# Patient Record
Sex: FEMALE | Race: WHITE | NOT HISPANIC OR LATINO
[De-identification: names, ages, dates, MRNs, and addresses within clinical notes are randomized per-mention and may not be internally consistent; named-entity substitution may affect disease eponyms.]

---

## 2017-08-15 ENCOUNTER — APPOINTMENT (OUTPATIENT)
Dept: ORTHOPEDIC SURGERY | Facility: CLINIC | Age: 62
End: 2017-08-15
Payer: COMMERCIAL

## 2017-08-15 VITALS
BODY MASS INDEX: 28.7 KG/M2 | OXYGEN SATURATION: 96 % | HEIGHT: 61 IN | DIASTOLIC BLOOD PRESSURE: 80 MMHG | WEIGHT: 152 LBS | SYSTOLIC BLOOD PRESSURE: 144 MMHG | HEART RATE: 95 BPM

## 2017-08-15 PROCEDURE — 99214 OFFICE O/P EST MOD 30 MIN: CPT

## 2017-08-15 RX ORDER — ROSUVASTATIN CALCIUM 10 MG/1
10 TABLET, FILM COATED ORAL
Refills: 0 | Status: ACTIVE | COMMUNITY
Start: 2017-08-15

## 2017-09-12 ENCOUNTER — TRANSCRIPTION ENCOUNTER (OUTPATIENT)
Age: 62
End: 2017-09-12

## 2018-04-24 ENCOUNTER — APPOINTMENT (OUTPATIENT)
Dept: ORTHOPEDIC SURGERY | Facility: CLINIC | Age: 63
End: 2018-04-24
Payer: COMMERCIAL

## 2018-04-24 VITALS
DIASTOLIC BLOOD PRESSURE: 80 MMHG | HEIGHT: 61 IN | BODY MASS INDEX: 29.27 KG/M2 | HEART RATE: 76 BPM | WEIGHT: 155 LBS | OXYGEN SATURATION: 98 % | SYSTOLIC BLOOD PRESSURE: 130 MMHG

## 2018-04-24 PROCEDURE — 99214 OFFICE O/P EST MOD 30 MIN: CPT

## 2018-09-05 ENCOUNTER — APPOINTMENT (OUTPATIENT)
Dept: ORTHOPEDIC SURGERY | Facility: CLINIC | Age: 63
End: 2018-09-05
Payer: COMMERCIAL

## 2018-09-05 VITALS
HEIGHT: 61.02 IN | WEIGHT: 158 LBS | SYSTOLIC BLOOD PRESSURE: 120 MMHG | OXYGEN SATURATION: 97 % | BODY MASS INDEX: 29.83 KG/M2 | HEART RATE: 90 BPM | DIASTOLIC BLOOD PRESSURE: 70 MMHG

## 2018-09-05 PROCEDURE — 99214 OFFICE O/P EST MOD 30 MIN: CPT

## 2019-01-29 ENCOUNTER — APPOINTMENT (OUTPATIENT)
Dept: ORTHOPEDIC SURGERY | Facility: CLINIC | Age: 64
End: 2019-01-29
Payer: COMMERCIAL

## 2019-01-29 PROCEDURE — 99214 OFFICE O/P EST MOD 30 MIN: CPT

## 2019-01-29 NOTE — HISTORY OF PRESENT ILLNESS
[FreeTextEntry1] : This is an 5 month f/u visit for this 63 year old woman , with diagnosis of primary hyperparathyroidism and osteoporosis.  \par The patient has been seen by me for the past 7.5 years, with diagnosis of primary hyperparathyroidism established then, but history of documented hypercalcemia dates back to age 52. Menopause age 50. \par  patient sustained a hairline fx of her left humerus, seen only on MRI.\par She tripped over a low gate, fx not seen on x-ray, MRI done about 6 weeks after fall, patient treated with sling.\par \par Salient features of medical history:\par 1. The patient meets surgical criteria for parathyroidectomy in two ways: she has had a kidney stone in 2009, and has osteoporosis of the cortical radius\par 2. The patient is resistant to surgery and prefers to pursue medical management\par 3. as such, Ms. Babb took alendronate from 2011- 2015, 4 years, tolerated well some GI upset.\par She has been getting calcium only from diet, and vitamin D 1000 IU. She is careful to maintain good hydration with 2 Liters/day.\par ALN was dc'ed 2015, 2  year ago.\par \par Today, labs done 18 were rev and compared with: 18 and 17 and  17 ,16 and .\par glucose: 101\par PTH/calcium; 110/10.1  alb: 4.5; 69/10.3; 74/9.8 alb: 4.2;  93/10.1  alb: 4.3; 71/9.7 alb 4.2;  99/10.0, albumin 4.1,  82/10.6, albumin 4.3;  values were 71/10.3 with albumin 4.7.\par 25 D: 44.6, prior: 40.7, 44.2,  ; 39, prior 33.5, 36, had been 50, and 29 on 500 IU/day\par Bone markers are suppressed with \par CTX:  not updated: 27,  119, 56, prior 171, prior 263, had been  189, had been 124 (prior was 157) and \par BSAP: not updated;  9.4, 8.4, 7.4, prior 14.5, 11.0, had been 9.3 had been 7.1  (was 7.7 prior). (on ALN)\par Creatinine is normal: 0.76, prior: 0.68, 0.58;  not updated 0.60, prior 0.69 had been 0.64 and 0.61 \par \par Today, lipids done 1218 were rev: 18 were rev, compared with 2017;\par chol:196,  203, 189\par LDL:90,  98, 91\par HDL: 80, 82, 73, \par T, 114, 126\par chol/HDL: 2.45, 2.5, 2.58\par \par Today, DXA done 7/10/18 was rev and compared with: DXA, done  16 and  11/3/14 \par T-scores: -0.4, -0.9, -0.7 and -2.9 at the LS, L FN, L TH and L 1/3 radius\par T-scores: -0.8,-1.3, -0.1 and -3.0 at the spine, L FN, L TH and L 1/3 radius\par compared with: are -0.6, -0.8 and -1.6 at the spine, left TH, Left FN respectively.\par \par Hx: had an abnormal pap, but bx shows everything clear\par \par Height is essentially stable and there is no history of fracture.\par Ht: 5'1"\par \par Medication:\par omeprazole 40 mg\par Xanax 0.5 prn up to TID\par \par calcium: diet , cheese, one citracal petite a day\par vitamin D: 1000 mg/day

## 2019-01-29 NOTE — REASON FOR VISIT
[Follow-Up: _____] : a [unfilled] follow-up visit [FreeTextEntry1] : primary hyperparathyroidism and osteoporosis of forearm

## 2019-01-29 NOTE — ASSESSMENT
[FreeTextEntry1] : 63 year old woman with diagnosis of primary hyperparathyroidism, who meets surgical criteria by history of kidney stone (2009) and osteoporosis of the cortical radius, but has declined surgery to date, and is being managed medically with modest calcium intake, and liberal fluid intake. At today's visit she did note she might consider parathyoid surgery. She was treated with alendronate for 4 years, June 2011-June 2015, is on holiday now for past 3.5 years.  DXA is stable with normal BMD at spine and hip and osteoporosis limited to forearm. \par \par PLAN:\par 1. continue calcium 400-600 mg/day, takes one calcium citrate, 200  for stone prevention (citrate)\par 2. continue vitamin D 1000 IU/day\par 3. labs  ordered for June 2019; f/u thereafter\par 4.consider Prolia in future, currently density stable

## 2019-09-10 ENCOUNTER — APPOINTMENT (OUTPATIENT)
Dept: ORTHOPEDIC SURGERY | Facility: CLINIC | Age: 64
End: 2019-09-10
Payer: COMMERCIAL

## 2019-09-10 VITALS — WEIGHT: 153 LBS | OXYGEN SATURATION: 97 % | BODY MASS INDEX: 29.26 KG/M2 | HEIGHT: 60.63 IN | HEART RATE: 63 BPM

## 2019-09-10 DIAGNOSIS — E21.3 HYPERPARATHYROIDISM, UNSPECIFIED: ICD-10-CM

## 2019-09-10 DIAGNOSIS — M81.0 AGE-RELATED OSTEOPOROSIS W/OUT CURRENT PATHOLOGICAL FRACTURE: ICD-10-CM

## 2019-09-10 PROCEDURE — 99214 OFFICE O/P EST MOD 30 MIN: CPT

## 2019-09-10 NOTE — ASSESSMENT
[FreeTextEntry1] : 64 year old woman with diagnosis of primary hyperparathyroidism since age 52, who meets surgical criteria by history of kidney stone (2009) and osteoporosis of the cortical radius, but has declined surgery to date, and is being managed medically with modest calcium intake, and liberal fluid intake. At today's visit the patient did note she might consider parathyroid surgery. She was treated with alendronate for 4 years, June 2011-June 2015, is on holiday now for past 4 years.  DXA is stable with normal BMD at spine and hip and osteoporosis limited to forearm. \par \par PLAN:\par 1. continue calcium 400-600 mg/day, takes one calcium citrate, 200  for stone prevention (citrate)\par 2. continue vitamin D 1000 IU/day\par 3. labs  ordered for Jan 2020; f/u thereafter\par 4.consider Prolia in future, currently density stable

## 2019-09-10 NOTE — HISTORY OF PRESENT ILLNESS
[FreeTextEntry1] : This is an 9 month f/u visit for this 64 year old woman , with diagnosis of primary hyperparathyroidism and osteoporosis.  \par The patient has been seen by me for the past 8.5 years, with diagnosis of primary hyperparathyroidism established then, but history of documented hypercalcemia dates back to age 52. Menopause age 50. \par  patient sustained a hairline fx of her left humerus, seen only on MRI.\par She tripped over a low gate, fx not seen on x-ray, MRI done about 6 weeks after fall, patient treated with sling.\par \par Salient features of medical history:\par 1. The patient meets surgical criteria for parathyroidectomy in two ways: she has had a kidney stone in 2009, and has osteoporosis of the cortical radius\par 2. The patient is resistant to surgery and prefers to pursue medical management\par 3. as such, Ms. Babb took alendronate from 2011- 2015, 4 years, tolerated well some GI upset.\par She has been getting calcium only from diet, and vitamin D 1000 IU. She is careful to maintain good hydration with 2 Liters/day.\par ALN was dc'ed 2015,4  year ago.\par \par Today, labs done   were rev and compared with: 18 ,18 and 17 and  17 ,16 and .\par glucose: 80,101\par PTH/calcium; 65/10.5  alb: 4.7, 110/10.1  alb: 4.5; 69/10.3; 74/9.8 alb: 4.2;  93/10.1  alb: 4.3; 71/9.7 alb 4.2;  99/10.0, albumin 4.1,  82/10.6, albumin 4.3;  values were 71/10.3 with albumin 4.7.\par 25 D:51.5,  44.6, prior: 40.7, 44.2,  ; 39, prior 33.5, 36, had been 50, and 29 on 500 IU/day\par Bone markers are suppressed with \par CTX:  134, 127,  119, 56, prior 171, prior 263, had been  189, had been 124 (prior was 157) and \par BSAP: 10.4,   9.4, 8.4, 7.4, prior 14.5, 11.0, had been 9.3 had been 7.1  (was 7.7 prior). (on ALN)\par Creatinine is normal:0.64,  0.76, prior: 0.68, 0.58;  not updated 0.60, prior 0.69 had been 0.64 and 0.61 \par \par lipids done 1218 were rev: 18 were rev, compared with 2017;\par chol:196,  203, 189\par LDL:90,  98, 91\par HDL: 80, 82, 73, \par T, 114, 126\par chol/HDL: 2.45, 2.5, 2.58\par \par  DXA done 7/10/18 was rev and compared with: DXA, done  16 and  11/3/14 \par T-scores: -0.4, -0.9, -0.7 and -2.9 at the LS, L FN, L TH and L 1/3 radius\par T-scores: -0.8,-1.3, -0.1 and -3.0 at the spine, L FN, L TH and L 1/3 radius\par compared with: are -0.6, -0.8 and -1.6 at the spine, left TH, Left FN respectively.\par \par Hx: had an abnormal pap, but bx shows everything clear\par \par Height is essentially stable and there is no history of fracture.\par Ht: 5'1"\par \par Medication:\par omeprazole 40 mg\par Xanax 0.5 prn up to TID\par \par calcium: diet , cheese, one citracal petite a day\par vitamin D: 1000 mg/day